# Patient Record
Sex: MALE | Race: WHITE
[De-identification: names, ages, dates, MRNs, and addresses within clinical notes are randomized per-mention and may not be internally consistent; named-entity substitution may affect disease eponyms.]

---

## 2018-06-13 ENCOUNTER — HOSPITAL ENCOUNTER (EMERGENCY)
Dept: HOSPITAL 25 - UCCORT | Age: 63
Discharge: HOME | End: 2018-06-13
Payer: COMMERCIAL

## 2018-06-13 VITALS — DIASTOLIC BLOOD PRESSURE: 81 MMHG | SYSTOLIC BLOOD PRESSURE: 131 MMHG

## 2018-06-13 DIAGNOSIS — H60.92: Primary | ICD-10-CM

## 2018-06-13 DIAGNOSIS — Z87.891: ICD-10-CM

## 2018-06-13 PROCEDURE — G0463 HOSPITAL OUTPT CLINIC VISIT: HCPCS

## 2018-06-13 PROCEDURE — 99202 OFFICE O/P NEW SF 15 MIN: CPT

## 2018-06-13 NOTE — UC
Ear Complaint HPI





- HPI Summary


HPI Summary: 





Left ear pain for a few days. He has been using peroxide without help. No 

fevers or hearing loss. He says he has had this twelve times and every time it 

is made better with Rx of amoxicillin. 





- History of Current Complaint


Chief Complaint: UCEar


Stated Complaint: EAR ACHE


Time Seen by Provider: 06/13/18 12:00


Hx Obtained From: Patient


Onset/Duration: Gradual Onset, Lasting Days


Severity Initially: Mild


Severity Currently: Moderate


Pain Intensity: 3


Aggravating Factors: Nothing


Alleviating Factors: Nothing


Associated Signs/Symptoms: Negative: Discharge, Hearing Loss, Foreign Body 

Sensation, Trauma to Ear, Swelling @, URI Symptoms





- Allergies/Home Medications


Allergies/Adverse Reactions: 


 Allergies











Allergy/AdvReac Type Severity Reaction Status Date / Time


 


No Known Allergies Allergy   Verified 06/13/18 11:56











Home Medications: 


 Home Medications





amLODIPine/Benazepril 10/20(NF [Lotrel 10/20(NF)] 1 cap PO DAILY 06/13/18 [

History Confirmed 06/13/18]











PMH/Surg Hx/FS Hx/Imm Hx


Previously Healthy: No - OE





- Surgical History


Surgical History: Yes


Surgery Procedure, Year, and Place: SIMPLE MASECTOMY FOR CYST





- Family History


Known Family History: Positive: Other - no related family history of ear 

disease.





- Social History


Alcohol Use: Occasionally


Substance Use Type: None


Smoking Status (MU): Former Smoker


Type: Cigarettes


When Did the Patient Quit Smoking/Using Tobacco: 8/02





Review of Systems


ENT: Ear Ache


All Other Systems Reviewed And Are Negative: Yes





Physical Exam


Triage Information Reviewed: Yes


Appearance: Well-Appearing, No Pain Distress, Well-Nourished


Vital Signs: 


 Initial Vital Signs











Temp  98.9 F   06/13/18 11:52


 


Pulse  60   06/13/18 11:52


 


Resp  13   06/13/18 11:52


 


BP  131/81   06/13/18 11:52


 


Pulse Ox  99   06/13/18 11:52











Vital Signs Reviewed: Yes


Eyes: Positive: Conjunctiva Clear


ENT: Positive: Normal ENT inspection, Pharynx normal, TMs normal, Uvula midline

, Other - left tragus tender without swelling. Canal is mildly swollen and 

tender with speculum exam..  Negative: Pharyngeal erythema, Nasal congestion, 

Nasal drainage, TM bulging, TM dull, TM red, Tonsillar swelling, Tonsillar 

exudate, Trismus, Muffled voice, Sinus tenderness


Neck: Positive: Supple, Nontender, No Lymphadenopathy


Respiratory: Positive: Lungs clear, Normal breath sounds, No respiratory 

distress, No accessory muscle use.  Negative: Respiratory distress, Decreased 

breath sounds, Accessory muscle use, Crackles, Rhonchi, Stridor, Wheezing


Cardiovascular: Positive: No Murmur, Pulses Normal, Brisk Capillary Refill


Abdomen Description: Positive: No Organomegaly, Soft.  Negative: Distended, 

Guarding


Musculoskeletal: Positive: Strength Intact, ROM Intact, No Edema


Neurological: Positive: Alert, Muscle Tone Normal.  Negative: Fatigued


Psychological: Positive: Age Appropriate Behavior


Skin: Negative: rashes





Ear Complaint Course/Dx





- Course


Course Of Treatment: We discussed ear drops. He refused. He wanted amoxicillin 

which has worked in the past several times.





- Differential Dx/Diagnosis


Differential Diagnosis/HQI/PQRI: Cellulitis, Cerumen Impaction, Otitis Externa, 

Otitis Media, Pharyngitis, URI


Provider Diagnoses: left OE





Discharge





- Sign-Out/Discharge


Documenting (check all that apply): Discharge/Admit/Transfer





- Discharge Plan


Condition: Good


Disposition: HOME


Prescriptions: 


Amoxicillin PO (*) [Amoxicillin 875 MG (*)] 875 mg PO BID #20 tab


Patient Education Materials:  Otitis Externa (ED)


Referrals: 


Non Staff,Doctor [Primary Care Provider] - 


Additional Instructions: 


REturn if not better in a few days. 





- Billing Disposition and Condition


Condition: GOOD


Disposition: Home

## 2020-09-15 ENCOUNTER — TELEPHONE ENCOUNTER (OUTPATIENT)
Dept: URBAN - METROPOLITAN AREA CLINIC 19 | Facility: CLINIC | Age: 65
End: 2020-09-15

## 2020-09-17 PROBLEM — 428283002: Status: ACTIVE | Noted: 2020-09-17

## 2020-10-19 ENCOUNTER — OFFICE VISIT (OUTPATIENT)
Dept: URBAN - METROPOLITAN AREA CLINIC 19 | Facility: CLINIC | Age: 65
End: 2020-10-19

## 2020-10-27 ENCOUNTER — OFFICE VISIT (OUTPATIENT)
Dept: URBAN - METROPOLITAN AREA LAB 2 | Facility: LAB | Age: 65
End: 2020-10-27
Payer: COMMERCIAL

## 2020-10-27 DIAGNOSIS — Z86.010 H/O ADENOMATOUS POLYP OF COLON: ICD-10-CM

## 2020-10-27 PROCEDURE — G9936 PMH PLYP/NEO CO/RECT/JUN/ANS: HCPCS | Performed by: INTERNAL MEDICINE

## 2020-10-27 PROCEDURE — G0105 COLORECTAL SCRN; HI RISK IND: HCPCS | Performed by: INTERNAL MEDICINE

## 2020-10-27 RX ORDER — AMLODIPINE BESYLATE/BENAZEPRIL 10 MG-20MG
TAKE 1 CAPSULE BY ORAL ROUTE ONCE DAILY CAPSULE ORAL 1
Qty: 0 | Refills: 0 | Status: ACTIVE | COMMUNITY
Start: 1900-01-01 | End: 1900-01-01